# Patient Record
Sex: MALE | Race: WHITE
[De-identification: names, ages, dates, MRNs, and addresses within clinical notes are randomized per-mention and may not be internally consistent; named-entity substitution may affect disease eponyms.]

---
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
## 2020-10-05 ENCOUNTER — HOSPITAL ENCOUNTER (EMERGENCY)
Dept: HOSPITAL 11 - JP.ED | Age: 20
LOS: 1 days | Discharge: HOME | End: 2020-10-06
Payer: MEDICAID

## 2020-10-05 DIAGNOSIS — F32.9: Primary | ICD-10-CM

## 2020-10-05 DIAGNOSIS — F17.210: ICD-10-CM

## 2020-10-05 DIAGNOSIS — M41.9: ICD-10-CM

## 2020-10-05 DIAGNOSIS — F19.20: ICD-10-CM
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
## 2021-10-05 ENCOUNTER — HOSPITAL ENCOUNTER (EMERGENCY)
Dept: HOSPITAL 11 - JP.ED | Age: 21
Discharge: HOME | End: 2021-10-05
Payer: MEDICAID
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
## 2021-10-05 DIAGNOSIS — S40.022A: Primary | ICD-10-CM

## 2021-10-05 DIAGNOSIS — Z72.0: ICD-10-CM

## 2021-10-05 DIAGNOSIS — M41.9: ICD-10-CM
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
## 2021-10-05 NOTE — CRLCR
For Patients:  As a result of the 21st Century Cures Act, medical imaging 

exams and procedure reports are released immediately into your electronic 

medical record.  You may view this report before your referring provider.  

If you have questions, please contact your health care provider.



Indication:



Fall, pain



Technique:



Three views



Comparison:



None



Findings:



Bones: Alignment is normal. No fractures or bone lesions.  



Joint spaces: Unremarkable.   



Soft tissues: Unremarkable.



Dictated by Tino Sherman MD @ 10/5/2021 4:49:05 AM



(Electronically Signed)

## 2021-10-05 NOTE — CRLCR
For Patients:  As a result of the 21st Century Cures Act, medical imaging 

exams and procedure reports are released immediately into your electronic 

medical record.  You may view this report before your referring provider.  

If you have questions, please contact your health care provider.



Indication:



Pain after fall



Technique:



Two views



Comparison:



None



Findings:



Arm is held straight as the patient could not bend the elbow. No definite 

fracture seen. No gross effusion identified although evaluation of the 

elbow is somewhat limited. No definite dislocation.



Dictated by Tino Sherman MD @ 10/5/2021 4:48:15 AM



(Electronically Signed)

## 2022-08-09 ENCOUNTER — HOSPITAL ENCOUNTER (EMERGENCY)
Dept: HOSPITAL 11 - JP.ED | Age: 22
Discharge: LEFT BEFORE DISCHARGE | End: 2022-08-09
Payer: MEDICAID

## 2022-08-09 DIAGNOSIS — Z53.21: Primary | ICD-10-CM

## 2023-05-26 ENCOUNTER — HOSPITAL ENCOUNTER (EMERGENCY)
Dept: HOSPITAL 11 - JP.ED | Age: 23
Discharge: HOME | End: 2023-05-26
Payer: MEDICAID

## 2023-05-26 DIAGNOSIS — F10.920: ICD-10-CM

## 2023-05-26 DIAGNOSIS — Z72.0: ICD-10-CM
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
## 2023-05-26 DIAGNOSIS — K29.21: Primary | ICD-10-CM

## 2023-05-26 LAB
ALBUMIN SERPL-MCNC: 3.9 G/DL (ref 3.4–5)
ALBUMIN/GLOB SERPL: 1.1 {RATIO} (ref 1.2–2.2)
ALP SERPL-CCNC: 98 U/L (ref 46–116)
ALT SERPL-CCNC: 31 U/L (ref 12–78)
AMPHET UR QL SCN: NEGATIVE
AMPHET UR QL SCN: NEGATIVE
ANION GAP SERPL CALC-SCNC: 18 MMOL/L (ref 5–14)
APPEARANCE UR: CLEAR
APTT PPP: 26.2 SEC (ref 21.8–27.3)
AST SERPL-CCNC: 23 U/L (ref 15–37)
BACTERIA URNS QL MICRO: (no result)
BARBITURATES UR QL SCN: NEGATIVE
BASOPHILS # BLD AUTO: 0.05 K/UL (ref 0–0.1)
BASOPHILS NFR BLD AUTO: 0.6 % (ref 0.1–1.3)
BENZODIAZ UR QL SCN: NEGATIVE
BILIRUB SERPL-MCNC: 0.5 MG/DL (ref 0.2–1)
BILIRUB UR STRIP-MCNC: NEGATIVE MG/DL
BUN SERPL-MCNC: 9 MG/DL (ref 7–18)
CALCIUM SERPL-MCNC: 8.9 MG/DL (ref 8.5–10.1)
CHLORIDE SERPL-SCNC: 102 MMOL/L (ref 100–108)
CO2 SERPL-SCNC: 20 MMOL/L (ref 21–32)
COLOR UR: YELLOW
CREAT CL 24H UR+SERPL-VRATE: 115.01 ML/MIN
CREAT SERPL-MCNC: 0.9 MG/DL (ref 0.8–1.3)
EOSINOPHIL # BLD AUTO: 0.25 K/UL (ref 0–0.4)
EOSINOPHIL NFR BLD AUTO: 3.1 % (ref 0–5.4)
EPI CELLS #/AREA URNS HPF: (no result) /[HPF]
GLOBULIN SER-MCNC: 3.6 G/DL (ref 2.3–3.5)
GLUCOSE SERPL-MCNC: 101 MG/DL (ref 74–106)
GLUCOSE UR STRIP-MCNC: NEGATIVE MG/DL
HCT VFR BLD AUTO: 46.2 % (ref 38.4–49.7)
HGB BLD-MCNC: 17.2 G/DL (ref 12.9–16.9)
IMM GRANULOCYTES # BLD: 0.02 K/UL (ref 0–0.23)
IMM GRANULOCYTES NFR BLD: 0.2 % (ref 0–0.7)
INR PPP: 1.1
KETONES UR STRIP-MCNC: NEGATIVE MG/DL
LYMPHOCYTES # BLD AUTO: 3.12 K/UL (ref 0.8–3.3)
LYMPHOCYTES NFR BLD AUTO: 38.8 % (ref 11.4–47.7)
MCH RBC QN AUTO: 33 PG (ref 31.6–35.5)
MCHC RBC AUTO-ENTMCNC: 37.2 G/DL (ref 31.6–35.5)
MCHC RBC AUTO-ENTMCNC: 88.7 FL (ref 81.4–99)
METHADONE UR QL SCN: NEGATIVE
MONOCYTES # BLD AUTO: 0.72 K/UL (ref 0.2–0.9)
MONOCYTES NFR BLD AUTO: 9 % (ref 3.3–12.6)
MUCOUS THREADS URNS QL MICRO: (no result)
NEUTROPHILS # BLD AUTO: 3.88 K/UL (ref 1–7.6)
NEUTROPHILS NFR BLD AUTO: 48.3 % (ref 40–78.1)
NITRITE UR QL: NEGATIVE
OXYCODONE UR QL SCN: NEGATIVE
PH UR STRIP: 5.5 [PH] (ref 5–8)
PLATELET # BLD AUTO: 287 K/UL (ref 130–375)
POTASSIUM SERPL-SCNC: 4 MMOL/L (ref 3.6–5.2)
PROPOXYPH UR QL SCN: NEGATIVE
PROT SERPL-MCNC: 7.5 G/DL (ref 6.4–8.2)
PROT UR STRIP-MCNC: NEGATIVE MG/DL
PROTHROMBIN TIME: 11.2 SEC (ref 9.2–10.6)
RBC # BLD AUTO: 5.21 M/UL (ref 4.14–5.76)
RBC # URNS HPF: (no result) /ML (ref 0–5)
RBC UR QL: NEGATIVE
SODIUM SERPL-SCNC: 136 MMOL/L (ref 140–148)
SP GR UR STRIP: <= 1.005 (ref 1.01–1.03)
THC UR QL SCN>50 NG/ML: NEGATIVE
UROBILINOGEN UR STRIP-ACNC: 0.2 EU/DL (ref 0.2–1)
WBC # BLD AUTO: 8 K/UL (ref 3.2–11)
WBC UR QL: (no result) (ref 0–5)

## 2023-05-26 PROCEDURE — 85730 THROMBOPLASTIN TIME PARTIAL: CPT

## 2023-05-26 PROCEDURE — 80053 COMPREHEN METABOLIC PANEL: CPT

## 2023-05-26 PROCEDURE — 80305 DRUG TEST PRSMV DIR OPT OBS: CPT

## 2023-05-26 PROCEDURE — 86901 BLOOD TYPING SEROLOGIC RH(D): CPT

## 2023-05-26 PROCEDURE — 85025 COMPLETE CBC W/AUTO DIFF WBC: CPT

## 2023-05-26 PROCEDURE — 96375 TX/PRO/DX INJ NEW DRUG ADDON: CPT

## 2023-05-26 PROCEDURE — 74177 CT ABD & PELVIS W/CONTRAST: CPT

## 2023-05-26 PROCEDURE — 96365 THER/PROPH/DIAG IV INF INIT: CPT

## 2023-05-26 PROCEDURE — C9113 INJ PANTOPRAZOLE SODIUM, VIA: HCPCS

## 2023-05-26 PROCEDURE — 81001 URINALYSIS AUTO W/SCOPE: CPT

## 2023-05-26 PROCEDURE — 80307 DRUG TEST PRSMV CHEM ANLYZR: CPT

## 2023-05-26 PROCEDURE — 99284 EMERGENCY DEPT VISIT MOD MDM: CPT

## 2023-05-26 PROCEDURE — 86850 RBC ANTIBODY SCREEN: CPT

## 2023-05-26 PROCEDURE — 96361 HYDRATE IV INFUSION ADD-ON: CPT

## 2023-05-26 PROCEDURE — 86900 BLOOD TYPING SEROLOGIC ABO: CPT

## 2023-05-26 PROCEDURE — 85610 PROTHROMBIN TIME: CPT

## 2023-05-26 PROCEDURE — 36415 COLL VENOUS BLD VENIPUNCTURE: CPT

## 2024-08-19 ENCOUNTER — HOSPITAL ENCOUNTER (EMERGENCY)
Dept: HOSPITAL 11 - JP.ED | Age: 24
Discharge: HOME | End: 2024-08-19
Payer: MEDICAID

## 2024-08-19 DIAGNOSIS — L20.9: Primary | ICD-10-CM

## 2024-08-19 DIAGNOSIS — L03.90: ICD-10-CM

## 2024-08-19 PROCEDURE — 99283 EMERGENCY DEPT VISIT LOW MDM: CPT
